# Patient Record
Sex: FEMALE | Race: WHITE
[De-identification: names, ages, dates, MRNs, and addresses within clinical notes are randomized per-mention and may not be internally consistent; named-entity substitution may affect disease eponyms.]

---

## 2017-05-31 ENCOUNTER — HOSPITAL ENCOUNTER (OUTPATIENT)
Dept: HOSPITAL 39 - GMAJ | Age: 67
Discharge: HOME | End: 2017-05-31
Attending: FAMILY MEDICINE
Payer: MEDICARE

## 2017-05-31 DIAGNOSIS — E55.9: ICD-10-CM

## 2017-05-31 DIAGNOSIS — D51.3: Primary | ICD-10-CM

## 2017-08-07 ENCOUNTER — HOSPITAL ENCOUNTER (OUTPATIENT)
Dept: HOSPITAL 39 - LAB.O | Age: 67
Discharge: HOME | End: 2017-08-07
Attending: INTERNAL MEDICINE
Payer: MEDICARE

## 2017-08-07 DIAGNOSIS — K76.0: ICD-10-CM

## 2017-08-07 DIAGNOSIS — K74.60: Primary | ICD-10-CM

## 2018-02-09 ENCOUNTER — HOSPITAL ENCOUNTER (OUTPATIENT)
Dept: HOSPITAL 39 - LAB.O | Age: 68
End: 2018-02-09
Attending: INTERNAL MEDICINE
Payer: MEDICARE

## 2018-02-09 DIAGNOSIS — K74.60: Primary | ICD-10-CM

## 2018-06-05 ENCOUNTER — HOSPITAL ENCOUNTER (OUTPATIENT)
Dept: HOSPITAL 39 - GMAL | Age: 68
End: 2018-06-05
Attending: FAMILY MEDICINE
Payer: MEDICARE

## 2018-06-05 DIAGNOSIS — E55.9: ICD-10-CM

## 2018-06-05 DIAGNOSIS — D51.3: Primary | ICD-10-CM

## 2018-08-29 ENCOUNTER — HOSPITAL ENCOUNTER (OUTPATIENT)
Dept: HOSPITAL 39 - LAB.O | Age: 68
End: 2018-08-29
Attending: INTERNAL MEDICINE
Payer: MEDICARE

## 2018-08-29 DIAGNOSIS — I85.10: ICD-10-CM

## 2018-08-29 DIAGNOSIS — K76.0: ICD-10-CM

## 2018-08-29 DIAGNOSIS — K74.60: Primary | ICD-10-CM

## 2018-08-29 DIAGNOSIS — T82.868A: ICD-10-CM

## 2018-08-30 ENCOUNTER — HOSPITAL ENCOUNTER (OUTPATIENT)
Dept: HOSPITAL 39 - MAMMO | Age: 68
End: 2018-08-30
Attending: FAMILY MEDICINE
Payer: MEDICARE

## 2018-08-30 DIAGNOSIS — Z12.31: Primary | ICD-10-CM

## 2018-08-31 NOTE — MAM
EXAM DESCRIPTION: 

3D Screening BILATERAL : Digital Mammography.



CLINICAL HISTORY: 

68 years Female SCREENING . No personal history or family history

of breast cancer. Childbirth. Postmenopausal: Hysterectomy.

Taking HRT 5 or more years ago. "Fibrocystic breast tissue.". 

Lifetime risk of developing breast cancer (Tyrer-Cuzick model) is

6.1 %.



COMPARISON: 

2-D digital screening bilateral study 4/28/2015.  No prior

reports available.  



TECHNIQUE: 

Bilateral CC and MLO projection full-field images, Digital

tomosynthesis mammographic technique.  Bilateral digital 2-D

full-field MLO images.  CAD not utilized. 



FINDINGS: 

The breast parenchymal density pattern is:  Scattered areas of

fibroglandular density.   No skin thickening or nipple

retraction.  Tissue is heterogeneously dense in the anterior

thirds including retroareolar regions bilaterally.

No new focal, stellate mass or density, focal asymmetry , and no

suspicious microcalcifications bilaterally. Stable mammograms

compared to prior study.  Taking into account, differences in

mammographic technique.



IMPRESSION: 

Benign exam.



BIRAD CATEGORY: 2 BENIGN FINDINGS.



RECOMMENDATIONS:

FOLLOW UP: Routine digital bilateral  screening, one year

interval from  August 2018.



Written communication explaining the IMPRESSION and follow-up,

will be mailed to the patient and referring health care provider.



According to the [employer] College [employer], yearly mammograms

are recommended starting at age 40 and continuing as long as a

woman is in good health.  Any breast change noted on a breast

self-exam should be reported promptly to the patient's healthcare

provider.  Breast MRI is recommended for women with an

approximately 20-25% or greater lifetime risk of breast cancer,

including women with a strong family history of breast or ovarian

cancer and women who have been treated for Hodgkin's disease.



A negative mammographic report should not delay tissue diagnosis

in patients with significant clinical history or physical

findings.



Extremely dense breast tissue limits the sensitivity of digital

mammography. 



Electronically signed by:  Chirag Mahajan MD  8/31/2018 2:05 PM CDT

Workstation: [id number]

## 2018-12-04 ENCOUNTER — HOSPITAL ENCOUNTER (OUTPATIENT)
Dept: HOSPITAL 39 - GMAL | Age: 68
End: 2018-12-04
Attending: FAMILY MEDICINE
Payer: MEDICARE

## 2018-12-04 DIAGNOSIS — K76.0: ICD-10-CM

## 2018-12-04 DIAGNOSIS — I10: ICD-10-CM

## 2018-12-04 DIAGNOSIS — K74.60: Primary | ICD-10-CM

## 2018-12-04 DIAGNOSIS — E55.9: ICD-10-CM

## 2018-12-04 DIAGNOSIS — R79.89: ICD-10-CM

## 2019-01-23 ENCOUNTER — HOSPITAL ENCOUNTER (OUTPATIENT)
Dept: HOSPITAL 39 - US | Age: 69
End: 2019-01-23
Attending: INTERNAL MEDICINE
Payer: MEDICARE

## 2019-01-23 DIAGNOSIS — R16.1: ICD-10-CM

## 2019-01-23 DIAGNOSIS — K74.60: Primary | ICD-10-CM

## 2019-01-23 DIAGNOSIS — I77.819: ICD-10-CM

## 2019-01-24 NOTE — US
EXAM DESCRIPTION: 

Abdomen,Complete: Ultrasound.



CLINICAL HISTORY: 

LIVER CIRRHOSIS



COMPARISON: 

None Available.



TECHNIQUE: 

Transabdominal scannin-dimensional and Doppler modes.



FINDINGS: 

Gallbladder: Normal size and echogenicity with no stones or

sludge. No wall thickening, 2 mm, and no or fluid. Nontender with

transducer pressure.    

Common bile duct: Normal caliber 3 mm.

Liver: Long axis right lobe 14 cm. Heterogeneous increased

density. Normal hepatopedal flow of the portal vein. Normal

caliber. No intrahepatic biliary dilatation. Minimal lobulation

of the capsule. No ascites. 

Pancreas:  Included tissue normal echogenicity and ducts not

seen.  

Abdominal aorta: 2.5 cm proximally and 2.7 cm distally with

ectasia. 

IVC: visualized; normal caliber. 

Spleen normal echogenicity; long axis measurement is 16.2 cm.  

Right kidney: 9.1 cm long axis. Thin cortex with increased

echogenicity but less than the liver. Lobulated capsule No

hydronephrosis or perinephric fluid. 1.5 cm anechoic cyst.

Left kidney: 11.8 cm long axis with thin cortex, lobulation of

the capsule, and increased echogenicity. No hydronephrosis or

perinephric fluid. 3.4 x 2.2 cm cyst.



IMPRESSION: 

1. Small echogenicity dense echogenic liver with lobulated

capsule consistent with cirrhosis. Hepatopedal portal vein flow.

Normal ducts. No ascites.

2. Splenomegaly with no focal lesions. No ascites.

3. Bilateral kidneys with chronic disease indicated by increased

echogenicity, thin cortex and lobulated capsule. Pancreas and

gallbladder are unremarkable. Normal caliber of the common bile

duct.

4. Ectatic aorta but no aneurysm. Normal caliber of the IVC.



Electronically signed by:  Chirag Mahajan MD  2019 2:33 PM [facility]

Workstation: [id number]

## 2019-06-19 ENCOUNTER — HOSPITAL ENCOUNTER (OUTPATIENT)
Dept: HOSPITAL 39 - LAB.O | Age: 69
End: 2019-06-19
Attending: INTERNAL MEDICINE
Payer: MEDICARE

## 2019-06-19 DIAGNOSIS — K74.60: Primary | ICD-10-CM

## 2019-06-28 ENCOUNTER — HOSPITAL ENCOUNTER (OUTPATIENT)
Dept: HOSPITAL 39 - GMAL | Age: 69
End: 2019-06-28
Attending: FAMILY MEDICINE
Payer: MEDICARE

## 2019-06-28 DIAGNOSIS — E53.8: Primary | ICD-10-CM

## 2019-06-28 DIAGNOSIS — E11.9: ICD-10-CM

## 2019-06-28 DIAGNOSIS — R53.83: ICD-10-CM

## 2019-06-28 DIAGNOSIS — Z79.899: ICD-10-CM

## 2019-07-30 ENCOUNTER — HOSPITAL ENCOUNTER (OUTPATIENT)
Dept: HOSPITAL 39 - US | Age: 69
End: 2019-07-30
Attending: FAMILY MEDICINE
Payer: MEDICARE

## 2019-07-30 DIAGNOSIS — K76.0: ICD-10-CM

## 2019-07-30 DIAGNOSIS — K74.60: ICD-10-CM

## 2019-07-30 DIAGNOSIS — I71.3: Primary | ICD-10-CM

## 2019-07-30 DIAGNOSIS — K87: ICD-10-CM

## 2019-07-31 NOTE — US
EXAM DESCRIPTION: 

Abdomen,Complete: Ultrasound.



CLINICAL HISTORY: 

AAA, CIRRHOSIS



COMPARISON: 

None Available.



TECHNIQUE: 

Transabdominal scannin-dimensional and Doppler modes..

Technically difficult study due to patient body habitus.



FINDINGS: 

Gallbladder: No intraluminal stones or sludge. Mild wall

thickening 3.5 mm. No surrounding fluid. Nontender with

transducer pressure.    

Common bile duct: 3.5 mm normal caliber.

Liver: Long axis of the right lobe is 15.1 cm. Homogeneous

echogenicity which is increased. Smooth capsule with no ascites.

Normal ducts and caliber of the portal vein with hepatopedal

flow. 

Pancreas:  Not well seen. Duct not seen..  

Abdominal aorta: 3.44 cm proximally, 3.1 cm mid aorta and 2.5 cm

distal aorta. 

IVC: visualized; normal caliber. 

Spleen normal echogenicity; long axis measurement is 15.5 cm. 

Enlarged.

Right kidney: 9.9 cm long axis with cortical thinning but normal

echogenicity. No echogenic stones, no hydronephrosis, no

perinephric fluid.

Left kidney: 9.4 cm long axis with cortical thinning but normal

cortical echogenicity. No hydronephrosis, no echogenic stones,

and no perinephric fluid.



IMPRESSION: 

1. Steatosis of the liver but not enlarged. Normal ducts and

vessels. Smooth capsule but not ascites. Pancreas not well seen.

2. Gallbladder wall thickening but no intraluminal stones or

sludge. No pericholecystic fluid. Normal caliber common bile

duct.

3. Splenic enlargement. Normal caliber of the IVC.

4. Bilateral kidneys with thin cortex but normal echogenicity. No

hydronephrosis or perinephric fluid.

5. 3.4 cm abdominal aortic aneurysm. Recommend follow-up every 3

years.

Reference: J Am Desean Radiol 2013;10:789-794.







Electronically signed by:  Chirag Mahajan MD  2019 3:05 PM CDT

Workstation: [id number]

## 2019-09-26 ENCOUNTER — HOSPITAL ENCOUNTER (EMERGENCY)
Dept: HOSPITAL 39 - ER | Age: 69
Discharge: ANOTHER HOSPITAL | End: 2019-09-26
Payer: MEDICARE

## 2019-09-26 VITALS — OXYGEN SATURATION: 98 % | DIASTOLIC BLOOD PRESSURE: 66 MMHG | TEMPERATURE: 98 F | SYSTOLIC BLOOD PRESSURE: 139 MMHG

## 2019-09-26 DIAGNOSIS — I10: ICD-10-CM

## 2019-09-26 DIAGNOSIS — K21.9: ICD-10-CM

## 2019-09-26 DIAGNOSIS — R47.01: ICD-10-CM

## 2019-09-26 DIAGNOSIS — Z88.1: ICD-10-CM

## 2019-09-26 DIAGNOSIS — R30.0: ICD-10-CM

## 2019-09-26 DIAGNOSIS — I63.9: Primary | ICD-10-CM

## 2019-09-26 DIAGNOSIS — R94.31: ICD-10-CM

## 2019-09-26 DIAGNOSIS — Z88.8: ICD-10-CM

## 2019-09-26 DIAGNOSIS — K92.1: ICD-10-CM

## 2019-09-26 PROCEDURE — 80053 COMPREHEN METABOLIC PANEL: CPT

## 2019-09-26 PROCEDURE — 93005 ELECTROCARDIOGRAM TRACING: CPT

## 2019-09-26 PROCEDURE — 85025 COMPLETE CBC W/AUTO DIFF WBC: CPT

## 2019-09-26 PROCEDURE — 82270 OCCULT BLOOD FECES: CPT

## 2019-09-26 PROCEDURE — 36415 COLL VENOUS BLD VENIPUNCTURE: CPT

## 2019-09-26 PROCEDURE — 36416 COLLJ CAPILLARY BLOOD SPEC: CPT

## 2019-09-26 PROCEDURE — 81001 URINALYSIS AUTO W/SCOPE: CPT

## 2019-09-26 PROCEDURE — 86900 BLOOD TYPING SEROLOGIC ABO: CPT

## 2019-09-26 PROCEDURE — 82553 CREATINE MB FRACTION: CPT

## 2019-09-26 PROCEDURE — 71045 X-RAY EXAM CHEST 1 VIEW: CPT

## 2019-09-26 PROCEDURE — 85610 PROTHROMBIN TIME: CPT

## 2019-09-26 PROCEDURE — 82550 ASSAY OF CK (CPK): CPT

## 2019-09-26 PROCEDURE — 82948 REAGENT STRIP/BLOOD GLUCOSE: CPT

## 2019-09-26 PROCEDURE — 70450 CT HEAD/BRAIN W/O DYE: CPT

## 2019-09-26 PROCEDURE — 87086 URINE CULTURE/COLONY COUNT: CPT

## 2019-09-26 PROCEDURE — 86901 BLOOD TYPING SEROLOGIC RH(D): CPT

## 2019-09-26 PROCEDURE — 85730 THROMBOPLASTIN TIME PARTIAL: CPT

## 2019-09-26 PROCEDURE — 84484 ASSAY OF TROPONIN QUANT: CPT

## 2019-09-26 PROCEDURE — 86850 RBC ANTIBODY SCREEN: CPT

## 2019-09-26 NOTE — ED.PDOC
History of Present Illness





- General


Chief Complaint: GI Problem


Stated Complaint: WEAKNESS


Time Seen by Provider: 09/26/19 12:10


Source: patient, RN notes reviewed, Vital Signs reviewed, family - , 

RN/MD - Dr. Jackson


Exam Limitations: no limitations





- History of Present Illness


Initial Comments: 





Pt was sent from Dr. Jackson' office b/c of anemia/gi bleed but also because of an

epxpressive aphasia with concerns for a sub acute stroke.  Pt noted the aphasia 

starting yesterday morning.  Pt has had black tarry stool for the last few days.

 Pt also c/o dysuria.  Pt denies f/c/n/v/d.  No blurry vision but pt is dizzy 

and has nystagmus.  


Severity: moderate


Improving Factors: nothing


Worsening Factors: nothing


Associated Symptoms: malaise, weakness


Allergies/Adverse Reactions: 


Allergies





Amoxicillin [From Augmentin] Adverse Reaction (Verified 09/26/19 12:06)


   


Clavulanic Acid [From Augmentin] Adverse Reaction (Verified 09/26/19 12:06)


   


Prochlorperazine [From Compazine] Adverse Reaction (Verified 09/26/19 12:06)


   








Review of Systems





- Review of Systems


Constitutional: States: see HPI


EENTM: States: see HPI


Respiratory: States: no symptoms reported


Cardiology: States: no symptoms reported


Gastrointestinal/Abdominal: States: see HPI


Genitourinary: States: see HPI, dysuria


Musculoskeletal: States: no symptoms reported


Skin: States: no symptoms reported


Neurological: States: other - expressive aphasia


Endocrine: States: no symptoms reported


Hematologic/Lymphatic: States: no symptoms reported





Past Medical History (General)





- Patient Medical History


Hx Asthma: No


Hx of COPD: No


Hx Congestive Heart Failure: No


Hx Hypertension: Yes


Hx Diabetes: No


Hx Gastroesophageal Reflux: Yes


Hx Cancer: No


Hx Hepatitis C: No


Surgical History: tonsillectomy, Hysterectomy





- Vaccination History


Hx Tetanus, Diphtheria Vaccination: Yes


Hx Influenza Vaccination: Yes


Hx Pneumococcal Vaccination: No





- Social History


Hx Tobacco Use: No


Hx Alcohol Use: No


Hx Substance Use: No


Hx Substance Use Treatment: No


Hx Depression: No





- Female History


Patient is a Female of Child Bearing Age (10 -59 yrs old): No





Family Medical History





- Family History


  ** Mother


Family History: Unknown





Physical Exam





- Physical Exam


General Appearance: Alert, Anxious, Well Developed, Well Groomed, Well Hydrated,

Well Nourished


Eye Exam: bilateral normal


Ears, Nose, Throat: hearing grossly normal, normal ENT inspection, normal 

pharynx


Neck: non-tender, full range of motion, supple, normal inspection


Respiratory: chest non-tender, lungs clear, normal breath sounds, no respiratory

distress, no accessory muscle use, respiratory distress


Cardiovascular/Chest: normal peripheral pulses, regular rate, rhythm, no edema, 

no gallop, no JVD, no murmur


Peripheral Pulses: radial,right: 2+, radial,left: 2+


Gastrointestinal/Abdominal: normal bowel sounds, non tender, soft, no 

organomegaly


Rectal Exam: heme positive stool


Back Exam: normal inspection, no CVA tenderness, no vertebral tenderness


Extremity: normal range of motion, non-tender, normal inspection, no pedal edema


Neurologic: CNs II-XII nml as tested, no motor/sensory deficits, alert, normal 

mood/affect, oriented x 3, aphasia, other - pt with mild expressive aphasia


Skin Exam: normal color, warm/dry


Lymphatic: no adenopathy





Progress





- Progress


Progress: 





09/26/19 15:01


                                        





09/26/19 12:10


IV Care:Saline Lock per Protoc QSHIFT 


Telemetry .ONCE 


Sodium Chloride 0.9% (Flush) [Saline Flush Syringe]   10 ml IV PRN PRN 





09/26/19 12:15


EKG STAT 








                         Laboratory Results - last 24 hr











  09/26/19 09/26/19 09/26/19





  12:25 12:25 12:25


 


WBC   10.2 


 


RBC   2.93 L 


 


Hgb   8.9 L 


 


Hct   26.7 L 


 


MCV   91.0 


 


MCH   30.4 


 


MCHC   33.4 


 


RDW   16.5 H 


 


Plt Count   95 L 


 


MPV   10.4 


 


Absolute Neuts (auto)   7.40 H 


 


Absolute Lymphs (auto)   2.20 


 


Absolute Monos (auto)   0.40 


 


Absolute Eos (auto)   0.10 


 


Absolute Basos (auto)   0.10 


 


Neutrophils %   72.8 


 


Lymphocytes %   21.6 


 


Monocytes %   4.3 


 


Eosinophils %   0.5 L 


 


Basophils %   0.8 


 


PT    12.1 H


 


INR    1.21 H


 


PTT (SP)    22.1


 


Sodium  143  


 


Potassium  4.0  


 


Chloride  115 H  


 


Carbon Dioxide  18 L  


 


Anion Gap  14.0  


 


BUN  57 H  


 


Creatinine  0.84  


 


BUN/Creatinine Ratio  67.9 H  


 


POC Glucose   


 


Random Glucose  120 H  


 


Serum Osmolality  302.0 H  


 


Calcium  9.5  


 


Total Bilirubin  1.4 H  


 


AST  43 H  


 


ALT  36  


 


Alkaline Phosphatase  60  


 


Creatine Kinase  53  


 


CK-MB (CK-2)  1.1  


 


CK-MB (CK-2) %  Not Reportable  


 


Troponin I  < 0.02  


 


Serum Total Protein  6.5  


 


Albumin  3.6  


 


Globulin  2.9  


 


Albumin/Globulin Ratio  1.2  


 


Stool Occult Blood   


 


Patient ABO/Rh   


 


Antibody Screen   














  09/26/19 09/26/19 09/26/19





  12:25 12:25 13:25


 


WBC   


 


RBC   


 


Hgb   


 


Hct   


 


MCV   


 


MCH   


 


MCHC   


 


RDW   


 


Plt Count   


 


MPV   


 


Absolute Neuts (auto)   


 


Absolute Lymphs (auto)   


 


Absolute Monos (auto)   


 


Absolute Eos (auto)   


 


Absolute Basos (auto)   


 


Neutrophils %   


 


Lymphocytes %   


 


Monocytes %   


 


Eosinophils %   


 


Basophils %   


 


PT   


 


INR   


 


PTT (SP)   


 


Sodium   


 


Potassium   


 


Chloride   


 


Carbon Dioxide   


 


Anion Gap   


 


BUN   


 


Creatinine   


 


BUN/Creatinine Ratio   


 


POC Glucose  121 H  


 


Random Glucose   


 


Serum Osmolality   


 


Calcium   


 


Total Bilirubin   


 


AST   


 


ALT   


 


Alkaline Phosphatase   


 


Creatine Kinase   


 


CK-MB (CK-2)   


 


CK-MB (CK-2) %   


 


Troponin I   


 


Serum Total Protein   


 


Albumin   


 


Globulin   


 


Albumin/Globulin Ratio   


 


Stool Occult Blood    Positive


 


Patient ABO/Rh   O POSITIVE 


 


Antibody Screen   Negative 











09/26/19 15:03


Pt's symptoms have not changed.  I believe pt had a mild CVA yesterday and she 

also has an upper gi bleed.  After d/w her gi specialist, Dr. Blas Carrion 

and he requests we send her to [facility] in [place].  I will arrange

transfer and admission.  


09/26/19 20:19





Mick Javier M.D.


#[id number]





- Results/Orders


Results/Orders: 





EXAM DESCRIPTION: CT Head w/o contrast.  CLINICAL HISTORY: expressive aphasia x 

24 hours  COMPARISON: None  TECHNIQUE: Noncontrast transaxial CT images of the 

head are obtained from base to vertex. This exam was performed according to our 

departmental dose-optimization program, which includes automated exposure 

control, adjustment of the mA and/or kV according to patient size and/or use of 

iterative reconstruction technique.  FINDINGS: The midline structures are not 

displaced. Sulci are age-appropriate. There are areas of decreased attenuation 

in the periventricular white matter and the white matter of the centrum 

semiovale. There is no evidence of mass, mass-effect, hydrocephalus, or acute 

intracranial hemorrhage. No abnormal extra axial fluid collection is seen. Bone 

windows show no evidence of depressed skull fracture. The visualized paranasal 

sinuses are unremarkable.  





IMPRESSION: 1. Age-appropriate atrophy with evidence of old small vessel 

ischemic type changes seen. 2. No acute abnormality is seen on noncontrast CT of

the head.  





Electronically signed by: Rory Saldaña MD 9/26/2019 12:57 PM CDT








Chest,1 View  CLINICAL HISTORY: expressive aphasia x 24 hours  COMPARISON: June 12, 2018  TECHNIQUE: Single frontal view of the chest  FINDINGS: Mild calcific 

atherosclerosis and tortuosity of thoracic aorta. Cardiac silhouette shows 

normal heart size. Pulmonary vascularity is within normal limits. Lungs show no 

confluent infiltrates. No perfusion. No pneumothorax. Visualized osseous 

structures show no destructive lesions.  





IMPRESSION: No acute cardiopulmonary process.  





Electronically signed by: Harshal Ram MD 9/26/2019 1:01 PM CDT Workstation: [id number]





- EKG/XRAY/CT


Comments: NSR@92 bpm, possible ant infarct, age indeterminate, abnormal ekg.





Departure





- Departure


Clinical Impression: 


 Upper GI bleed, Dysuria





CVA (cerebral vascular accident)


Qualifiers:


 CVA mechanism: unspecified Qualified Code(s): I63.9 - Cerebral infarction, 

unspecified





Time of Disposition: 15:11 - awaiting transfer


Disposition: Transfer to Hospital


Condition: Fair


Departure Forms:  ED Discharge - Pt. Copy, Patient Portal Self Enrollment


Referrals: 


Harshal Jackson III, MD [Primary Care Provider] - 1-2 Weeks





Critical Care Note





- Critical Care Note


Total Time (mins): 45 - Does not include time for procedures

## 2019-09-26 NOTE — RAD
EXAM DESCRIPTION: 



Chest,1 View



CLINICAL HISTORY: 

expressive aphasia x 24 hours



COMPARISON: 

June 12, 2018



TECHNIQUE: 

Single frontal view of the chest



FINDINGS: 

Mild calcific atherosclerosis and tortuosity of thoracic aorta.

Cardiac silhouette shows normal heart size.

Pulmonary vascularity is within normal limits.

Lungs show no confluent infiltrates.

No perfusion. No pneumothorax.

Visualized osseous structures show no destructive lesions. 



IMPRESSION: 

No acute cardiopulmonary process.



Electronically signed by:  Harshal Ram MD  9/26/2019 1:01 PM CDT

Workstation: [id number]

## 2019-10-01 ENCOUNTER — HOSPITAL ENCOUNTER (OUTPATIENT)
Dept: HOSPITAL 39 - GRHH | Age: 69
End: 2019-10-01
Attending: FAMILY MEDICINE
Payer: MEDICARE

## 2019-10-01 DIAGNOSIS — D62: Primary | ICD-10-CM

## 2019-10-01 DIAGNOSIS — K75.81: ICD-10-CM

## 2019-10-03 ENCOUNTER — HOSPITAL ENCOUNTER (OUTPATIENT)
Dept: HOSPITAL 39 - GMAL | Age: 69
End: 2019-10-03
Attending: FAMILY MEDICINE
Payer: MEDICARE

## 2019-10-03 DIAGNOSIS — D50.0: Primary | ICD-10-CM

## 2019-10-16 ENCOUNTER — HOSPITAL ENCOUNTER (OUTPATIENT)
Dept: HOSPITAL 39 - GRHH | Age: 69
Setting detail: OBSERVATION
LOS: 3 days | Discharge: HOME | End: 2019-10-19
Attending: NURSE PRACTITIONER | Admitting: NURSE PRACTITIONER
Payer: MEDICARE

## 2019-10-16 DIAGNOSIS — R41.82: ICD-10-CM

## 2019-10-16 DIAGNOSIS — E11.9: ICD-10-CM

## 2019-10-16 DIAGNOSIS — I85.10: ICD-10-CM

## 2019-10-16 DIAGNOSIS — Z88.0: ICD-10-CM

## 2019-10-16 DIAGNOSIS — K74.60: ICD-10-CM

## 2019-10-16 DIAGNOSIS — R53.1: ICD-10-CM

## 2019-10-16 DIAGNOSIS — D69.6: ICD-10-CM

## 2019-10-16 DIAGNOSIS — Z88.8: ICD-10-CM

## 2019-10-16 DIAGNOSIS — D50.9: Primary | ICD-10-CM

## 2019-10-16 DIAGNOSIS — K76.6: ICD-10-CM

## 2019-10-16 DIAGNOSIS — Z88.1: ICD-10-CM

## 2019-10-16 DIAGNOSIS — D61.818: ICD-10-CM

## 2019-10-16 DIAGNOSIS — I10: ICD-10-CM

## 2019-10-16 DIAGNOSIS — K76.0: ICD-10-CM

## 2019-10-16 DIAGNOSIS — Z87.891: ICD-10-CM

## 2019-10-16 DIAGNOSIS — D72.819: ICD-10-CM

## 2019-10-16 DIAGNOSIS — Z98.890: ICD-10-CM

## 2019-10-16 DIAGNOSIS — R47.02: ICD-10-CM

## 2019-10-16 PROCEDURE — 85025 COMPLETE CBC W/AUTO DIFF WBC: CPT

## 2019-10-16 PROCEDURE — 86850 RBC ANTIBODY SCREEN: CPT

## 2019-10-16 PROCEDURE — 82728 ASSAY OF FERRITIN: CPT

## 2019-10-16 PROCEDURE — 86922 COMPATIBILITY TEST ANTIGLOB: CPT

## 2019-10-16 PROCEDURE — 83735 ASSAY OF MAGNESIUM: CPT

## 2019-10-16 PROCEDURE — 36415 COLL VENOUS BLD VENIPUNCTURE: CPT

## 2019-10-16 PROCEDURE — 83550 IRON BINDING TEST: CPT

## 2019-10-16 PROCEDURE — 80053 COMPREHEN METABOLIC PANEL: CPT

## 2019-10-16 PROCEDURE — 86901 BLOOD TYPING SEROLOGIC RH(D): CPT

## 2019-10-16 PROCEDURE — 86900 BLOOD TYPING SEROLOGIC ABO: CPT

## 2019-10-16 PROCEDURE — 94760 N-INVAS EAR/PLS OXIMETRY 1: CPT

## 2019-10-16 PROCEDURE — 81001 URINALYSIS AUTO W/SCOPE: CPT

## 2019-10-16 PROCEDURE — 83540 ASSAY OF IRON: CPT

## 2019-10-16 PROCEDURE — 80048 BASIC METABOLIC PNL TOTAL CA: CPT

## 2019-10-16 PROCEDURE — 96375 TX/PRO/DX INJ NEW DRUG ADDON: CPT

## 2019-10-16 PROCEDURE — 96374 THER/PROPH/DIAG INJ IV PUSH: CPT

## 2019-10-16 PROCEDURE — 82270 OCCULT BLOOD FECES: CPT

## 2019-10-17 RX ADMIN — Medication SCH ML: at 21:48

## 2019-10-17 RX ADMIN — CYCLOBENZAPRINE HYDROCHLORIDE SCH MG: 10 TABLET, FILM COATED ORAL at 21:48

## 2019-10-17 RX ADMIN — SODIUM CHLORIDE PRN MLS/HR: 450 INJECTION, SOLUTION INTRAVENOUS at 17:22

## 2019-10-17 NOTE — HP
SUPERVISING PHYSICIAN:  Keven Agustin M.D.



CHIEF COMPLAINT:  Weakness and change in mental status.



HISTORY OF PRESENT ILLNESS:  This is a 69 year-old female patient who has a 
history of nonalcoholic fatty liver disease.  She was actually in the Emergency 
Room in the hospital on 09/26/19.  At that time she was found to have similar 
symptoms.  She had some mental status changes with some mild aphasia and was 
found to have a hemoglobin at that was 8.9 with hematocrit 26.7.  She was given 
some hydration and was found to have some gastrointestinal bleeding.  She was 
sent to [facility] and  found to have esophageal varices.  She had 5 of her 
varices banded.  Her hemoglobin dropped to as low as 6.  She has been home 
approximately 3 weeks.  Today she again had some altered mental status and with 
extreme weakness.  She was in the bathtub and her home health nurse came for a 
visit.  The patient was unable to get out of the tub and the nurse actually had 
to take the door off the hinges to get to the patient.  She assisted her out of 
the bathroom and then Dr. Jackson was called.  Lab had been done.  WBCs were 2,000
with hemoglobin 9.5, hematocrit 29, platelets 54.  She does have chronic 
thrombocytopenia and leukopenia.  Electrolytes were basically within normal 
limits.  Iron saturation was low at 18.8 with ferritin 49.4.  Dr. Jackson spoke to
the physician that did the procedure 3 weeks ago and he felt that she could be 
placed in the hospital for observation as well as close monitoring of any 
possible bleeding as well as her monitoring her H&H.  She was directly admitted 
to the hospital in stable condition.  Her initial vital signs showed temperature
97.8, heart rate 70, blood pressure 131/77, respiratory rate 16, O2 sat 99% on 2
liters nasal cannula.  The patient was in stable condition after her admission 
for observation to the Medical/Surgical Unit.



PAST MEDICAL HISTORY: 

1.   Hypertension.

2.   Nonalcoholic fatty liver disease with cirrhosis.

3.   Recent diagnoses of esophageal varices with banding approximately 3 weeks 
ago.

4.   Portal hypertension secondary to cirrhosis.

5.   Type 2 diabetes mellitus.



PAST SURGICAL HISTORY:

1.   Tonsillectomy and adenoidectomy.

2.   Dilatation and curettage for retained placental fragments.

3.   Hysterectomy.

4.   Cervical laminectomy.

5.   Exploratory laparotomy for ruptured corpus luteum cyst.

6.   Excision of lipoma from right axilla.

7.   Exploratory laparotomy for lysis of adhesions.

8.   Bunionectomy of the left great toe.



OUTPATIENT MEDICATIONS:  Per the EMR and awaiting verification.



ALLERGIES:  AMOXICILLIN, AUGMENTIN AND COMPAZINE.



SOCIAL HISTORY:  She is a retired nurse.  She is .  She has 2 children.  
She has [facility].  She has smoked for many years but only 
smoked 1 to 5 cigarettes daily.  She quit about 1 month ago.  She drinks alcohol
very infrequently and denies any illicit drug use.



REVIEW OF SYSTEMS: 

GENERAL:  Positive for fatigue.  Negative for fever or weight changes.

HEENT:  Negative for sinus symptoms, ear pain, vision changes or sore throat.

RESPIRATORY:  Negative for coughing wheezing or shortness of breath.

CARDIAC:  Negative for chest pain, palpitations or tachycardia.

GASTROINTESTINAL:  Negative for nausea, vomiting, diarrhea or constipation.  
Please see the History of Present Illness.

GENITOURINARY:  Negative for hematuria, dysuria or polyuria.

SKIN:  Negative for lesions or rashes.

NEUROLOGIC:  Positive for weakness as well as some mild aphasia.  Negative for 
headaches or seizures.

HEMATOLOGIC:  As per the History of Present Illness.



PHYSICAL EXAMINATION: 



VITAL SIGNS:  Temperature 97.8, heart rate 70, blood pressure 131/77, 
respiratory rate 16, O2 sat 99% on 2 liters nasal cannula.



GENERAL:  This is a 69 year-old female patient who is lying in her hospital bed.
 She is in no acute distress.



HEENT:  Normocephalic and atraumatic.  Pupils are equal and reactive.  
Oropharynx is clear.



NECK:  Supple without mass.



RESPIRATORY:  Essentially clear to auscultation bilaterally.



CHEST:  There is equal rise and fall of the chest with inspiration and 
expiration.



CARDIOVASCULAR:  Regular rate and rhythm.



GASTROINTESTINAL:  Abdomen is soft, nondistended, non-tender.  Bowel sounds are 
positive.



EXTREMITIES:  +1 edema bilaterally.  No cyanosis or clubbing.



SKIN:  Warm and dry.



NEUROLOGIC:  Her speech is slowed and she takes some time to answer questions, 
but otherwise her cranial nerves II-XII are grossly intact.



LABORATORY:  Labs and films are as per the History of Present Illness.



ASSESSMENT: 

1.   Anemia, normochromic/microcytic most likely due to esophageal varices.

2.   Nonalcoholic fatty liver disease with cirrhosis may be contributing to #1.

3.   Altered mental status that has improved with mild dysphasia.

4.   Recent diagnosis of esophageal varices.  She had banding about 3 weeks ago.

5.   Portal hypertension.

6.   Diabetes mellitus type 2.

7.   Hypertension on medications.

8.   Chronic thrombocytopenia secondary to #2.

9.   Leukopenia, chronic.



PLAN:  The patient has been placed in Observation.  She will be gently hydrated 
overnight.  I will recheck her CBC at 10:00 tonight and will recheck her labs in
the morning.  If she deteriorates overnight we can transfer to [facility].  Otherwise, neuro checks are ordered and I'll restart her home 
meds once verified. I've started a PPI for ulcer prophylaxis and SDCs for DVT 
prophylaxis. We will monitor closely and follow as needed.



#[id number]

[facility]

## 2019-10-18 RX ADMIN — PANTOPRAZOLE SODIUM SCH MG: 40 INJECTION, POWDER, FOR SOLUTION INTRAVENOUS at 06:29

## 2019-10-18 RX ADMIN — Medication SCH ML: at 09:42

## 2019-10-18 RX ADMIN — Medication SCH ML: at 21:37

## 2019-10-18 RX ADMIN — SPIRONOLACTONE SCH MG: 25 TABLET, FILM COATED ORAL at 09:42

## 2019-10-18 RX ADMIN — FUROSEMIDE SCH: 40 TABLET ORAL at 09:40

## 2019-10-18 RX ADMIN — SODIUM CHLORIDE PRN MLS/HR: 450 INJECTION, SOLUTION INTRAVENOUS at 02:58

## 2019-10-18 RX ADMIN — CYCLOBENZAPRINE HYDROCHLORIDE SCH MG: 10 TABLET, FILM COATED ORAL at 21:27

## 2019-10-19 VITALS — SYSTOLIC BLOOD PRESSURE: 115 MMHG | TEMPERATURE: 98 F | DIASTOLIC BLOOD PRESSURE: 75 MMHG | OXYGEN SATURATION: 96 %

## 2019-10-19 RX ADMIN — Medication SCH ML: at 09:16

## 2019-10-19 RX ADMIN — SPIRONOLACTONE SCH MG: 25 TABLET, FILM COATED ORAL at 09:16

## 2019-10-19 RX ADMIN — FUROSEMIDE SCH MG: 40 TABLET ORAL at 09:16

## 2019-10-19 RX ADMIN — PANTOPRAZOLE SODIUM SCH MG: 40 INJECTION, POWDER, FOR SOLUTION INTRAVENOUS at 06:01

## 2019-10-20 NOTE — DS
SUPERVISING PHYSICIAN:  



ADMISSION DIAGNOSIS:

1.   Anemia, normochromic/microcytic most likely due to esophageal varices.

2.   Nonalcoholic fatty liver disease with cirrhosis may be contributing to #1.

3.   Altered mental status that has improved with mild dysphasia.

4.   Recent diagnosis of esophageal varices.  She had banding about 3 weeks ago.

5.   Portal hypertension.

6.   Diabetes mellitus type 2.

7.   Hypertension on medications.

8.   Chronic thrombocytopenia secondary to #2.

9.   Leukopenia, chronic.



DISCHARGE DIAGNOSIS: 

1.   Anemia, normochromic/microcytic most likely due to esophageal varices

      requiring transfusion of 2 units of packed red blood cells showing to be 
stable.

2.   Nonalcoholic fatty liver disease with cirrhosis may be contributing to #1.

3.   Altered mental status secondary to #1, resolved.

4.   Recent diagnosis of esophageal varices.  She had banding about 3 weeks ago.

5.   Portal hypertension.

6.   Diabetes mellitus type 2.

7.   Hypertension on medications.

8.   Chronic thrombocytopenia secondary to #2.

9.   Leukopenia, chronic.



REASON FOR HOSPITALIZATION:  This is a 69 year-old female patient who has a 
history of nonalcoholic fatty liver disease.  She was actually in the Emergency 
Room in the hospital on 09/26/19.  At that time she was found to have similar 
symptoms.  She had some mental status changes with some mild aphasia and was 
found to have a hemoglobin at that was 8.9 with hematocrit 26.7.  She was given 
some hydration and was found to have some gastrointestinal bleeding.  She was 
sent to [facility] and  found to have esophageal varices.  She had 5 of her 
varices banded.  Her hemoglobin dropped to as low as 6.  She has been home 
approximately 3 weeks.  Today she again had some altered mental status and with 
extreme weakness.  She was in the bathtub and her home health nurse came for a 
visit.  The patient was unable to get out of the tub and the nurse actually had 
to take the door off the hinges to get to the patient.  She assisted her out of 
the bathroom and then Dr. Jackson was called.  Lab had been done.  WBCs were 2,000
with hemoglobin 9.5, hematocrit 29, platelets 54.  She does have chronic 
thrombocytopenia and leukopenia.  Electrolytes were basically within normal 
limits.  Iron saturation was low at 18.8 with ferritin 49.4.  Dr. Jackson spoke to
the physician that did the procedure 3 weeks ago and he felt that she could be 
placed in the hospital for observation as well as close monitoring of any 
possible bleeding as well as her monitoring her H&H.  She was directly admitted 
to the hospital in stable condition.  Her initial vital signs showed temperature
97.8, heart rate 70, blood pressure 131/77, respiratory rate 16, O2 sat 99% on 2
liters nasal cannula.  The patient was in stable condition after her admission 
for observation to the Medical/Surgical Unit.



LABORATORY:  Admission white count was 2,000, discharge white count was 1,500.  
Hemoglobin initially was 9.5 and 29.0, with 2 units of packed red blood cells at
discharge hemoglobin was 10.2, hematocrit 30.  Platelet count was low at 54,000 
on admission, at discharge was 43,000.  Differential showed to be without a left
shift.  Chemistries showed normal electrolytes on admission and at discharge 
electrolytes were normal except just a mildly low potassium at 3.4.  Bilirubin 
was elevated at 2.0 with AST and ALT were both normal.  Iron studies showed iron
at 68 with TIBC of 361 with iron saturation of 18 and normal ferritin at 49.4.  
Urinalysis was within normal limits.  She had on stool occult blood that was 
negative.  



RADIOLOGY:  She had no radiographic studies while in the hospital.



HOSPITAL COURSE:  Ms. Lopez was admitted for questionable GI bleed, upper, 
due to esophageal varices.  She was transfused 2 units of packed red blood cells
showing to be stable with no complications.  She was hemodynamically stable with
discharge vitals showing temperature 98, pulse 71, blood pressure 115/75, 
respirations 16, satting 96% on room air.  



PHYSICAL EXAMINATION:  



GENERAL:  She was alert and appeared to be in no acute distress.



CHEST:  Clear to auscultation.



HEART:  Regular rate and rhythm.



ABDOMEN:  Soft, non-tender.  Positive bowel sounds.



EXTREMITIES:  Without any edema.



NEUROLOGIC:  She was alert and oriented times three.



She was noted to be stable enough to continue with outpatient management and to 
followup with her primary care provider, Dr. Jackson, as well as GI specialist 
once discharged.



PLAN:  Ms. Lopez was discharged on 10/19/19 with instructions to followup 
with Dr. Jackson on 10/21/19 at 1515.  She was to resume her usual diet.  Activity
is increase as tolerated.  She was to wear a mask when out in public and to 
resume her home medications as directed.  She was told to return to the [facility] if
she had any concerning symptoms.  Medications at discharge were continued as 
prior to hospitalization.



DISPOSITION:  The patient was discharged home.  Condition on discharge was 
stable and improved.



#[id number]

[facility]

## 2019-10-21 ENCOUNTER — HOSPITAL ENCOUNTER (OUTPATIENT)
Dept: HOSPITAL 39 - GRHH | Age: 69
End: 2019-10-21
Attending: FAMILY MEDICINE
Payer: MEDICARE

## 2019-10-21 DIAGNOSIS — D50.0: Primary | ICD-10-CM

## 2019-12-03 ENCOUNTER — HOSPITAL ENCOUNTER (OUTPATIENT)
Dept: HOSPITAL 39 - GMAL | Age: 69
End: 2019-12-03
Attending: FAMILY MEDICINE
Payer: MEDICARE

## 2019-12-03 DIAGNOSIS — D50.0: Primary | ICD-10-CM

## 2020-03-10 ENCOUNTER — HOSPITAL ENCOUNTER (OUTPATIENT)
Age: 70
End: 2020-03-10
Payer: MEDICARE

## 2020-03-10 DIAGNOSIS — K74.69: Primary | ICD-10-CM

## 2020-03-10 DIAGNOSIS — D50.0: ICD-10-CM

## 2020-06-25 ENCOUNTER — HOSPITAL ENCOUNTER (OUTPATIENT)
Dept: HOSPITAL 39 - GMAL | Age: 70
End: 2020-06-25
Attending: FAMILY MEDICINE
Payer: MEDICARE

## 2020-06-25 DIAGNOSIS — D50.0: Primary | ICD-10-CM

## 2020-06-25 DIAGNOSIS — Z79.899: ICD-10-CM

## 2020-09-28 ENCOUNTER — HOSPITAL ENCOUNTER (OUTPATIENT)
Dept: HOSPITAL 39 - GMAL | Age: 70
End: 2020-09-28
Attending: FAMILY MEDICINE
Payer: MEDICARE

## 2020-09-28 DIAGNOSIS — R39.15: Primary | ICD-10-CM

## 2020-10-06 ENCOUNTER — HOSPITAL ENCOUNTER (OUTPATIENT)
Dept: HOSPITAL 39 - US | Age: 70
End: 2020-10-06
Attending: INTERNAL MEDICINE
Payer: MEDICARE

## 2020-10-06 DIAGNOSIS — N28.9: ICD-10-CM

## 2020-10-06 DIAGNOSIS — Z12.31: Primary | ICD-10-CM

## 2020-10-06 DIAGNOSIS — R16.1: ICD-10-CM

## 2020-10-06 DIAGNOSIS — N28.1: ICD-10-CM

## 2020-10-06 DIAGNOSIS — K86.89: ICD-10-CM

## 2020-10-06 DIAGNOSIS — K74.60: ICD-10-CM

## 2020-10-07 NOTE — US
EXAM DESCRIPTION: 

Abdomen,Complete: Ultrasound.



CLINICAL HISTORY: 

70 years Female CIRRHOSIS OF LIVER



COMPARISON: 

None Available.



TECHNIQUE: 

Transabdominal scanning: grayscale and Doppler modes..

Technically difficult study due to patient body habitus.



FINDINGS: 

Gallbladder: normal size, shape, echogenicity; no intraluminal

stones or sludge. No fluid around the gallbladder. No wall

thickening. 2.8 mm. Non-tender with transducer pressure.



Common bile duct: caliber 3.4 mm within normal limits. 



Liver:  Minimally increased echogenicity; contour liver capsule

scalloped where seen. No fluid around the liver. Intrahepatic

biliary ducts normal caliber.  Doppler hepatopedal flow and

normal caliber portal vein 11.8 mm.  Long axis right lobe 14.7

cm.



Pancreas: normal size and increased echogenicity.  Duct not seen.





Complete abdominal aorta: Normal caliber from the proximal

segment to the distal bifurcation.. 



IVC: visualized and normal caliber.



Right kidney: long axis measures 8.0 cm; volume 74.8 mL..

Increased cortical echogenicity more than the liver..  10.8 mm

cortical thickness.   No echogenic stones; no hydronephrosis.



Left kidney: long axis measures 7.4 cm; volume 76.9 mL..

Increased cortical echogenicity more than the liver.. 3.1 cm

simple cortical cyst lower pole. Normal cortical thickness.   No

echogenic stones; no hydronephrosis.



Spleen:  Normal. No focal lesions..  16.9 cm long axis.



Other: None.



IMPRESSION: 

1. Liver slightly echogenic with normal size and physiologic

vascularity, but scalloped capsule. Is there clinical history of

cirrhosis? No ascites. Pancreatic steatosis but normal size.

2. Gallbladder and common bile duct are negative. Splenomegaly

with no focal lesions or fluid.

3. Bilateral kidneys with thin cortex, increased cortical

echogenicity greater than the liver but no perirenal fluid or

hydronephrosis. Correlate for chronic renal function problems.

3.1 cm cyst in the lower pole cortex of the left kidney. Normal

caliber of the IVC and abdominal aorta.



Electronically signed by:  Chirag Mahajan MD  10/7/2020 2:13 PM CDT

Workstation: [id number]

## 2020-10-08 NOTE — MAM
EXAM DESCRIPTION: 

3D Screening BILATERAL : Digital Mammography.



CLINICAL HISTORY: 

70 years Female SCREEN . No complaints. No personal or family

history of breast cancer. Menarche age 10. Childbirth age 25.

Hysterectomy and oophorectomy age 29. HRT 5 or more years ago.

Possible Catholic heritage. Lifetime risk of developing breast

cancer (Tyrer-Cuzick model)(%):  5.1.



COMPARISON: 

Bilateral screening digital breast tomosynthesis August 2018.  



TECHNIQUE: 

Bilateral CC and MLO projection full-field images, digital

tomosynthesis mammographic technique. Bilateral digital 2-D

full-field MLO images.  CAD available for 2-D images.  



FINDINGS: 

The breast parenchymal density pattern is: Scattered areas of

fibroglandular density. No skin thickening or nipple retraction. 

Axillary nodes. Solitary microcalcifications.

No new focal, stellate mass or density, focal asymmetry , and no

suspicious microcalcifications bilaterally. Stable mammograms

compared to prior study. 



IMPRESSION: 

Benign exam.



BIRAD CATEGORY: 2 BENIGN FINDINGS.



RECOMMENDATIONS:

FOLLOW UP: Routine digital bilateral  mammographic screening, one

year interval from  October 2020.



Written communication explaining the IMPRESSION and follow-up,

will be mailed to the patient and referring health care provider.

 



According to the American College of [employer], yearly mammograms

are recommended starting at age 40 and continuing as long as a

woman is in good health.  Any breast change noted on a breast

self-exam should be reported promptly to the patient's healthcare

provider.  Breast MRI is recommended for women with an

approximately 20-25% or greater lifetime risk of breast cancer,

including women with a strong family history of breast or ovarian

cancer and women who have been treated for Hodgkin's disease.  A

negative mammographic report should not delay tissue diagnosis in

patients with significant clinical history or physical findings. 

Extremely dense breast tissue limits the sensitivity of digital

mammography. 





Electronically signed by:  Chirag Mahajan MD  10/8/2020 5:49 PM CDT

Workstation: [id number]

## 2020-11-28 ENCOUNTER — HOSPITAL ENCOUNTER (EMERGENCY)
Dept: HOSPITAL 39 - ER | Age: 70
Discharge: HOME | End: 2020-11-28
Payer: MEDICARE

## 2020-11-28 VITALS — SYSTOLIC BLOOD PRESSURE: 120 MMHG | DIASTOLIC BLOOD PRESSURE: 70 MMHG | OXYGEN SATURATION: 95 % | TEMPERATURE: 98.2 F

## 2020-11-28 DIAGNOSIS — I10: ICD-10-CM

## 2020-11-28 DIAGNOSIS — Z79.899: ICD-10-CM

## 2020-11-28 DIAGNOSIS — Z87.891: ICD-10-CM

## 2020-11-28 DIAGNOSIS — Z88.1: ICD-10-CM

## 2020-11-28 DIAGNOSIS — D69.6: ICD-10-CM

## 2020-11-28 DIAGNOSIS — K21.9: ICD-10-CM

## 2020-11-28 DIAGNOSIS — E11.9: ICD-10-CM

## 2020-11-28 DIAGNOSIS — Z88.8: ICD-10-CM

## 2020-11-28 DIAGNOSIS — U07.1: Primary | ICD-10-CM

## 2020-11-28 DIAGNOSIS — K74.69: ICD-10-CM

## 2020-11-28 DIAGNOSIS — D49.0: ICD-10-CM

## 2020-11-28 PROCEDURE — 85730 THROMBOPLASTIN TIME PARTIAL: CPT

## 2020-11-28 PROCEDURE — 80053 COMPREHEN METABOLIC PANEL: CPT

## 2020-11-28 PROCEDURE — 85610 PROTHROMBIN TIME: CPT

## 2020-11-28 PROCEDURE — 86140 C-REACTIVE PROTEIN: CPT

## 2020-11-28 PROCEDURE — 85379 FIBRIN DEGRADATION QUANT: CPT

## 2020-11-28 PROCEDURE — 36415 COLL VENOUS BLD VENIPUNCTURE: CPT

## 2020-11-28 PROCEDURE — 85025 COMPLETE CBC W/AUTO DIFF WBC: CPT

## 2020-11-28 PROCEDURE — 71045 X-RAY EXAM CHEST 1 VIEW: CPT

## 2020-11-28 NOTE — RAD
EXAM:  XR Chest, 1 View



CLINICAL HISTORY:  COVID 19



TECHNIQUE:  Frontal view of the chest.



COMPARISON:  9/26/2019



FINDINGS:

  Lungs:  Mild airspace disease in both lung bases not excluded

versus overlying soft tissue attenuation.

  Pleural space:  No pneumothorax or pleural effusion.

  Heart:  Stable prominent cardiac shadow.

  Mediastinum:  No abnormality noted.

  Bones/joints:  No osseous destruction or sclerosis noted.



IMPRESSION:     

  Mild airspace disease in both lung bases not excluded versus

overlying soft tissue attenuation.  Recommend 2 view chest x-ray.



Electronically signed by:  Mercedes Lucia MD  11/28/2020 12:23 PM

[facility] Workstation: [id number]

## 2020-11-29 ENCOUNTER — HOSPITAL ENCOUNTER (EMERGENCY)
Dept: HOSPITAL 39 - ER | Age: 70
Discharge: HOME | End: 2020-11-29
Payer: MEDICARE

## 2020-11-29 VITALS — DIASTOLIC BLOOD PRESSURE: 53 MMHG | OXYGEN SATURATION: 95 % | SYSTOLIC BLOOD PRESSURE: 91 MMHG

## 2020-11-29 VITALS — TEMPERATURE: 99.3 F

## 2020-11-29 DIAGNOSIS — K76.9: ICD-10-CM

## 2020-11-29 DIAGNOSIS — Z88.8: ICD-10-CM

## 2020-11-29 DIAGNOSIS — Z88.1: ICD-10-CM

## 2020-11-29 DIAGNOSIS — Z79.899: ICD-10-CM

## 2020-11-29 DIAGNOSIS — K21.9: ICD-10-CM

## 2020-11-29 DIAGNOSIS — U07.1: Primary | ICD-10-CM

## 2020-11-29 DIAGNOSIS — D61.818: ICD-10-CM

## 2020-11-29 DIAGNOSIS — Z87.891: ICD-10-CM

## 2020-11-29 DIAGNOSIS — I10: ICD-10-CM

## 2020-11-29 PROCEDURE — 36415 COLL VENOUS BLD VENIPUNCTURE: CPT

## 2020-11-29 PROCEDURE — 84484 ASSAY OF TROPONIN QUANT: CPT

## 2020-11-29 PROCEDURE — 85730 THROMBOPLASTIN TIME PARTIAL: CPT

## 2020-11-29 PROCEDURE — 83735 ASSAY OF MAGNESIUM: CPT

## 2020-11-29 PROCEDURE — 83615 LACTATE (LD) (LDH) ENZYME: CPT

## 2020-11-29 PROCEDURE — 85379 FIBRIN DEGRADATION QUANT: CPT

## 2020-11-29 PROCEDURE — 86140 C-REACTIVE PROTEIN: CPT

## 2020-11-29 PROCEDURE — 83880 ASSAY OF NATRIURETIC PEPTIDE: CPT

## 2020-11-29 PROCEDURE — 82550 ASSAY OF CK (CPK): CPT

## 2020-11-29 PROCEDURE — 80053 COMPREHEN METABOLIC PANEL: CPT

## 2020-11-29 PROCEDURE — 85384 FIBRINOGEN ACTIVITY: CPT

## 2020-11-29 PROCEDURE — 85610 PROTHROMBIN TIME: CPT

## 2020-11-29 PROCEDURE — 85025 COMPLETE CBC W/AUTO DIFF WBC: CPT

## 2020-11-29 PROCEDURE — 71045 X-RAY EXAM CHEST 1 VIEW: CPT

## 2020-11-29 PROCEDURE — 84443 ASSAY THYROID STIM HORMONE: CPT

## 2020-11-29 PROCEDURE — 82728 ASSAY OF FERRITIN: CPT

## 2020-11-29 PROCEDURE — 82553 CREATINE MB FRACTION: CPT

## 2020-11-29 NOTE — RAD
EXAM DESCRIPTION: 



Chest,1 View



CLINICAL HISTORY: 



covid, intermittent hypoxia 



COMPARISON: 



November 28, 2020



FINDINGS: 



Cardiac silhouette is within normal limits. Aorta is tortuous.

There is no focal parenchymal or pleural disease. There is no

acute osseous process visualized.



IMPRESSION: 



No evidence of acute cardiopulmonary disease.



 



Electronically signed by:  Roel Love MD  11/29/2020 2:53 PM

[facility] Workstation: [id number]

## 2020-11-29 NOTE — ED.PDOC
History of Present Illness





- General


Chief Complaint: General


Stated Complaint: COVID +


Time Seen by Provider: 11/29/20 13:52


Source: patient


Exam Limitations: no limitations





- History of Present Illness


Initial Comments: 





The patient is a 70-year-old  female presented emergency room secondary

to home.  The patient apparently had a night with numerous symptoms including 

breaking out in a sweat and low-grade fevers.  She had a monoclonal antibody 

given to her for coronavirus yesterday which I believe was completely 

appropriate.  She is not appear to be in respiratory distress.  Lung fields are 

relatively clear with only very mild scattered rhonchi.  The patient does have a

longstanding history of pancytopenia apparently related to her chronic liver 

disease.  White blood cell count was low at 0.8 yesterday.  The patient is 

completing a course of azithromycin but is not currently on a steroid.  No 

significant history of lung disease.


Timing/Duration: 1 week


Severity: mild


Improving Factors: nothing


Worsening Factors: nothing


Associated Symptoms: cough, malaise


Allergies/Adverse Reactions: 


Allergies





Tizanidine [From Zanaflex] Allergy (Verified 11/29/20 14:35)


   


Amoxicillin [From Augmentin] Adverse Reaction (Verified 11/29/20 14:35)


   


Clavulanic Acid [From Augmentin] Adverse Reaction (Verified 11/29/20 14:35)


   


Prochlorperazine [From Compazine] Adverse Reaction (Verified 11/29/20 14:35)


   





Home Medications: 


Ambulatory Orders





Furosemide 20 mg PO DAILY 10/17/19 


Spironolactone 25 mg PO DAILY 10/17/19 


Cholecalciferol [Vitamin D3] 50 mcg PO DAILY 11/28/20 


Esomeprazole Magnesium [Nexium] 40 mg PO DAILY 11/28/20 


Ferrous Sulfate [Iron (Ferrous Sulfate)] 50 mg PO DAILY 11/28/20 


Magnesium 500 mg PO DAILY 11/28/20 


Metformin HCl [Metformin Hydrochloride E] 250 mg PO DAILY 11/28/20 


Tramadol HCl 50 mg PO PRN 11/28/20 


Vitamin E 360 mg PO DAILY 11/28/20 


Albuterol Inhaler [Ventolin Hfa Inhaler] 2 puff INH Q4H PRN #1 inh 11/29/20 


Azithromycin 500 mg PO DAILY #5 tab 11/29/20 


predniSONE [Prednisone] 40 mg PO DAILY #10 tab 11/29/20 











Review of Systems





- Review of Systems


Constitutional: States: malaise


EENTM: States: no symptoms reported


Respiratory: States: cough


Cardiology: States: no symptoms reported


Gastrointestinal/Abdominal: States: no symptoms reported


Genitourinary: States: no symptoms reported


Musculoskeletal: States: no symptoms reported


Skin: States: no symptoms reported


Neurological: States: no symptoms reported


Endocrine: States: no symptoms reported


All other Systems: No Change from Baseline





Past Medical History (General)





- Patient Medical History


Hx Seizures: No


Hx Stroke: No


Hx Asthma: No


Hx of COPD: No


Hx Congestive Heart Failure: No


Hx Hypertension: Yes


Hx Diabetes: No


Hx Gastroesophageal Reflux: Yes


Hx Cancer: No


Hx Hepatitis C: No


Hx MRSA: No


Surgical History: tonsillectomy





- Vaccination History


Hx Tetanus, Diphtheria Vaccination: Yes


Hx Influenza Vaccination: Yes


Hx Pneumococcal Vaccination: Yes





- Social History


Hx Tobacco Use: Yes


Hx Alcohol Use: No


Hx Substance Use: No


Hx Substance Use Treatment: No


Hx Depression: No





- Activities of Daily Living


Hospice Agency (if applicable):: None





- Female History


Patient is a Female of Child Bearing Age (10 -59 yrs old): No





Family Medical History





- Family History


  ** Mother


Family History: Unknown





Physical Exam





- Physical Exam


General Appearance: Alert, Comfortable, No apparent distress


Eye Exam: bilateral normal


Ears, Nose, Throat: hearing grossly normal, normal pharynx


Neck: full range of motion, supple


Respiratory: no respiratory distress, no accessory muscle use, rhonchi - Very 

mild


Cardiovascular/Chest: normal peripheral pulses, regular rate, rhythm, no edema


Peripheral Pulses: radial,right: 2+, radial,left: 2+


Gastrointestinal/Abdominal: non tender, soft


Rectal Exam: deferred


Back Exam: no CVA tenderness, no vertebral tenderness


Extremity: non-tender, normal inspection, no pedal edema, normal capillary 

refill


Neurologic: CNs II-XII nml as tested, alert, normal mood/affect, oriented x 3


Skin Exam: normal color


Comments: 





                               Vital Signs - 24 hr











  11/29/20 11/29/20 11/29/20





  14:00 14:29 15:00


 


Temperature 99.3 F  99.3 F


 


Pulse Rate [ 77 77 81





pulse ox]   


 


Respiratory 18 18 18





Rate   


 


Blood Pressure 104/59  96/56





[Left Arm]   


 


O2 Sat by Pulse 95  94 L





Oximetry   














Progress





- Progress


Progress: 





11/29/20 15:34


The patient is a 70-year-old  female with coronavirus with borderline 

low oxygen levels.  She was watched here for several hours and oxygen 

saturations largely ranged from 90 to 94% on room air while at rest.  Chest x-

ray was reassuring.  White blood cell count had improved since yesterday.  I am 

going to have the patient continue low-dose azithromycin for another 5 days and 

we will write her for 5 days oral prednisone.  I do want her to follow back up 

with her primary care doctor in 2 to 3 days.  She is to continue to spot check 

her oxygen levels.  She did receive a dose of bamlanivimab yesterday which may 

be helping in light of her pancytopenia.  No blood thinners are warranted as 

well due to the thrombocytopenia.  She is to increase her hydration for the next

couple of days and her to hold her water pills for the next few days as blood 

pressures are borderline low.  ER warnings are given.





felix rogers [id number]





- Results/Orders


Results/Orders: 





                                Laboratory Tests











  11/29/20 11/29/20 11/29/20





  14:34 14:34 14:34


 


WBC   1.5 L* D 


 


RBC   3.51 L 


 


Hgb   11.4 L 


 


Hct   32.0 L 


 


MCV   91.2 


 


MCH   32.3 H 


 


MCHC   35.5 


 


RDW   14.3 


 


Plt Count   26 L* 


 


MPV   9.5 


 


Absolute Neuts (auto)   0.90 L 


 


Absolute Lymphs (auto)   0.50 L 


 


Absolute Monos (auto)   0.10 L 


 


Absolute Eos (auto)   0.00 


 


Absolute Basos (auto)   0.00 


 


Neutrophils %   58.0 


 


Lymphocytes %   34.8 


 


Monocytes %   6.4 


 


Eosinophils %   0.1 L 


 


Basophils %   0.7 


 


PT   


 


INR   


 


PTT (SP)   


 


Fibrinogen   


 


D-Dimer, Quantitative   


 


Sodium  133 L  


 


Potassium  3.3 L  


 


Chloride  99 L  


 


Carbon Dioxide  22  


 


Anion Gap  15.3  


 


BUN  18  


 


Creatinine  0.92  


 


BUN/Creatinine Ratio  19.6  


 


Random Glucose  107 H  


 


Serum Osmolality  268.8 L  


 


Calcium  7.9 L  


 


Magnesium  1.8  


 


Ferritin    619.3 H


 


Total Bilirubin  1.7 H D  


 


AST  82 H  


 


ALT  45  


 


Alkaline Phosphatase  75  


 


LD Total   


 


Creatine Kinase  116  


 


CK-MB (CK-2)  1.3  


 


CK-MB (CK-2) %  Not Reportable  


 


Troponin I  0.03  


 


C-Reactive Protein   


 


B-Natriuretic Peptide  25.7  


 


Serum Total Protein  6.9  


 


Albumin  3.4  


 


Globulin  3.5  


 


Albumin/Globulin Ratio  1.0 L  


 


TSH  1.48  














  11/29/20 11/29/20





  14:34 14:34


 


WBC  


 


RBC  


 


Hgb  


 


Hct  


 


MCV  


 


MCH  


 


MCHC  


 


RDW  


 


Plt Count  


 


MPV  


 


Absolute Neuts (auto)  


 


Absolute Lymphs (auto)  


 


Absolute Monos (auto)  


 


Absolute Eos (auto)  


 


Absolute Basos (auto)  


 


Neutrophils %  


 


Lymphocytes %  


 


Monocytes %  


 


Eosinophils %  


 


Basophils %  


 


PT  11.5 H 


 


INR  1.16 H 


 


PTT (SP)  33.2 H 


 


Fibrinogen  312 


 


D-Dimer, Quantitative  791.0 H* 


 


Sodium  


 


Potassium  


 


Chloride  


 


Carbon Dioxide  


 


Anion Gap  


 


BUN  


 


Creatinine  


 


BUN/Creatinine Ratio  


 


Random Glucose  


 


Serum Osmolality  


 


Calcium  


 


Magnesium  


 


Ferritin  


 


Total Bilirubin  


 


AST  


 


ALT  


 


Alkaline Phosphatase  


 


LD Total   174


 


Creatine Kinase  


 


CK-MB (CK-2)  


 


CK-MB (CK-2) %  


 


Troponin I  


 


C-Reactive Protein   8.7 H* D


 


B-Natriuretic Peptide  


 


Serum Total Protein  


 


Albumin  


 


Globulin  


 


Albumin/Globulin Ratio  


 


TSH  








Chest x-ray is reassuring.





Departure





- Departure


Clinical Impression: 


 Pancytopenia, COVID-19 virus infection





Disposition: Discharge to Home or Self Care


Condition: Fair


Departure Forms:  ED Discharge - Pt. Copy, Patient Portal Self Enrollment


Instructions:  Coronavirus Disease 2019 (COVID-19)


Diet: diabetic diet


Activity: increase activity as tolerated


Referrals: 


Harshal Jackson III, MD [Primary Care Provider] - 1-2 Weeks


Prescriptions: 


Azithromycin 500 mg PO DAILY #5 tab


predniSONE [Prednisone] 40 mg PO DAILY #10 tab


Albuterol Inhaler [Ventolin Hfa Inhaler] 2 puff INH Q4H PRN #1 inh


 PRN Reason: Shortness Of Breath


Home Medications: 


Ambulatory Orders





Furosemide 20 mg PO DAILY 10/17/19 


Spironolactone 25 mg PO DAILY 10/17/19 


Cholecalciferol [Vitamin D3] 50 mcg PO DAILY 11/28/20 


Esomeprazole Magnesium [Nexium] 40 mg PO DAILY 11/28/20 


Ferrous Sulfate [Iron (Ferrous Sulfate)] 50 mg PO DAILY 11/28/20 


Magnesium 500 mg PO DAILY 11/28/20 


Metformin HCl [Metformin Hydrochloride E] 250 mg PO DAILY 11/28/20 


Tramadol HCl 50 mg PO PRN 11/28/20 


Vitamin E 360 mg PO DAILY 11/28/20 


Albuterol Inhaler [Ventolin Hfa Inhaler] 2 puff INH Q4H PRN #1 inh 11/29/20 


Azithromycin 500 mg PO DAILY #5 tab 11/29/20 


predniSONE [Prednisone] 40 mg PO DAILY #10 tab 11/29/20 








Additional Instructions: 


The patient is a 70-year-old  female with coronavirus with borderline 

low oxygen levels.  She was watched here for several hours and oxygen 

saturations largely ranged from 90 to 94% on room air while at rest.  Chest x-

ray was reassuring.  White blood cell count had improved since yesterday.  I am 

going to have the patient continue low-dose azithromycin for another 5 days and 

we will write her for 5 days oral prednisone.  I do want her to follow back up 

with her primary care doctor in 2 to 3 days.  She is to continue to spot check 

her oxygen levels.  She did receive a dose of bamlanivimab yesterday which may 

be helping in light of her pancytopenia.  No blood thinners are warranted as 

well due to the thrombocytopenia.  She is to increase her hydration for the next

 couple of days and her to hold her water pills for the next few days as blood 

pressures are borderline low.  ER warnings are given.